# Patient Record
Sex: MALE | Race: WHITE | Employment: FULL TIME | URBAN - METROPOLITAN AREA
[De-identification: names, ages, dates, MRNs, and addresses within clinical notes are randomized per-mention and may not be internally consistent; named-entity substitution may affect disease eponyms.]

---

## 2017-09-13 ENCOUNTER — OFFICE VISIT (OUTPATIENT)
Dept: ORTHOPEDIC SURGERY | Age: 32
End: 2017-09-13

## 2017-09-13 VITALS
DIASTOLIC BLOOD PRESSURE: 90 MMHG | WEIGHT: 197 LBS | OXYGEN SATURATION: 99 % | HEART RATE: 69 BPM | BODY MASS INDEX: 26.11 KG/M2 | HEIGHT: 73 IN | TEMPERATURE: 97.8 F | SYSTOLIC BLOOD PRESSURE: 132 MMHG | RESPIRATION RATE: 14 BRPM

## 2017-09-13 DIAGNOSIS — M54.9 DISCOGENIC PAIN: Primary | ICD-10-CM

## 2017-09-13 RX ORDER — TRAMADOL HYDROCHLORIDE 50 MG/1
TABLET ORAL
COMMUNITY
End: 2017-09-13 | Stop reason: ALTCHOICE

## 2017-09-13 RX ORDER — CYCLOBENZAPRINE HCL 10 MG
TABLET ORAL
COMMUNITY
End: 2017-09-13 | Stop reason: ALTCHOICE

## 2017-09-13 RX ORDER — ETODOLAC 500 MG/1
TABLET, FILM COATED ORAL
COMMUNITY
End: 2017-09-13 | Stop reason: ALTCHOICE

## 2017-09-13 RX ORDER — HYDROCODONE BITARTRATE AND ACETAMINOPHEN 5; 325 MG/1; MG/1
TABLET ORAL
Refills: 0 | COMMUNITY
Start: 2017-09-06 | End: 2017-09-13 | Stop reason: ALTCHOICE

## 2017-09-13 RX ORDER — PREDNISONE 10 MG/1
TABLET ORAL
Refills: 0 | COMMUNITY
Start: 2017-08-31 | End: 2017-09-13 | Stop reason: ALTCHOICE

## 2017-09-13 RX ORDER — HYDROCODONE BITARTRATE AND ACETAMINOPHEN 5; 325 MG/1; MG/1
TABLET ORAL
Qty: 21 TAB | Refills: 0 | Status: SHIPPED | OUTPATIENT
Start: 2017-09-13 | End: 2017-10-11 | Stop reason: ALTCHOICE

## 2017-09-13 RX ORDER — OXYCODONE AND ACETAMINOPHEN 7.5; 325 MG/1; MG/1
TABLET ORAL
COMMUNITY
End: 2017-09-13 | Stop reason: ALTCHOICE

## 2017-09-13 RX ORDER — ALBUTEROL SULFATE 90 UG/1
AEROSOL, METERED RESPIRATORY (INHALATION)
COMMUNITY

## 2017-09-13 RX ORDER — HYDROCODONE BITARTRATE AND ACETAMINOPHEN 7.5; 3 MG/1; MG/1
TABLET ORAL
COMMUNITY
End: 2017-09-13 | Stop reason: ALTCHOICE

## 2017-09-13 RX ORDER — OXYCODONE AND ACETAMINOPHEN 5; 325 MG/1; MG/1
TABLET ORAL
COMMUNITY
Start: 2016-01-06 | End: 2017-09-13 | Stop reason: ALTCHOICE

## 2017-09-13 RX ORDER — NALBUPHINE HYDROCHLORIDE 20 MG/ML
20 INJECTION, SOLUTION INTRAMUSCULAR; INTRAVENOUS; SUBCUTANEOUS
COMMUNITY
End: 2017-10-11 | Stop reason: ALTCHOICE

## 2017-09-13 NOTE — MR AVS SNAPSHOT
Visit Information Date & Time Provider Department Dept. Phone Encounter #  
 9/13/2017  2:30 PM Billie Garcia MD [address] [facility] [phone] [id number] Follow-up Instructions Return in about 4 weeks (around 10/11/2017), or if symptoms worsen or fail to improve. Upcoming Health Maintenance Date Due Pneumococcal 19-64 Medium Risk (1 of 1 - PPSV23) 8/7/2002 DTaP/Tdap/Td series (1 - Tdap) 8/7/2004 INFLUENZA AGE 9 TO ADULT 8/1/2017 Allergies as of 9/13/2017  Review Complete On: 9/13/2017 By: Ziggy Nance LPN Severity Noted Reaction Type Reactions Penicillins    Unknown (comments) Current Immunizations  Never Reviewed No immunizations on file. Not reviewed this visit You Were Diagnosed With   
  
 Codes Comments Discogenic pain    -  Primary ICD-10-CM: M54.9 ICD-9-CM: 724.5 Vitals BP Pulse Temp Resp Height(growth percentile) Weight(growth percentile) 132/90 69 97.8 °F (36.6 °C) (Oral) 14 6' 1\" (1.854 m) 197 lb (89.4 kg) SpO2 BMI Smoking Status 99% 25.99 kg/m2 Current Every Day Smoker BMI and BSA Data Body Mass Index Body Surface Area  
 25.99 kg/m 2 2.15 m 2 Preferred Pharmacy Pharmacy Name Phone [phone] [facility] [place], [address] AT [address] [phone] Your Updated Medication List  
  
   
This list is accurate as of: 9/13/17  4:49 PM.  Always use your most recent med list.  
  
  
  
  
 HYDROcodone-acetaminophen 5-325 mg per tablet Commonly known as:  Jimbo Collar Take 1 po tid prn pain  
  
 nalbuphine 20 mg/mL injection Commonly known as:  NUBAIN  
20 mg. VENTOLIN HFA 90 mcg/actuation inhaler Generic drug:  albuterol Inhale 2 puffs by mouth every 4-6 hours as needed Prescriptions Printed Refills  HYDROcodone-acetaminophen (NORCO) 5-325 mg per tablet 0  
 Sig: Take 1 po tid prn pain  
 Class: Print We Performed the Following REFERRAL TO PHYSICAL THERAPY [XXG69 Custom] Comments:  
 Evaluate and Treat lumbar discogenic pain - Carlos method - Gee with Giovani Araiza Follow-up Instructions Return in about 4 weeks (around 10/11/2017), or if symptoms worsen or fail to improve. Referral Information Referral ID Referred By Referred To  
  
 [id number]. [address], [address] [place], [address] Phone: [phone] Visits Status Start Date End Date 1 New Request 9/13/17 9/13/18 If your referral has a status of pending review or denied, additional information will be sent to support the outcome of this decision. Patient Instructions Low Back Pain: Exercises Your Care Instructions Here are some examples of typical rehabilitation exercises for your condition. Start each exercise slowly. Ease off the exercise if you start to have pain. Your doctor or physical therapist will tell you when you can start these exercises and which ones will work best for you. How to do the exercises Press-up 1. Lie on your stomach, supporting your body with your forearms. 2. Press your elbows down into the floor to raise your upper back. As you do this, relax your stomach muscles and allow your back to arch without using your back muscles. As your press up, do not let your hips or pelvis come off the floor. 3. Hold for 15 to 30 seconds, then relax. 4. Repeat 2 to 4 times. Alternate arm and leg (bird dog) exercise Note: Do this exercise slowly. Try to keep your body straight at all times, and do not let one hip drop lower than the other. 1. Start on the floor, on your hands and knees. 2. Tighten your belly muscles. 3. Raise one leg off the floor, and hold it straight out behind you. Be careful not to let your hip drop down, because that will twist your trunk. 4. Hold for about 6 seconds, then lower your leg and switch to the other leg. 5. Repeat 8 to 12 times on each leg. 6. Over time, work up to holding for 10 to 30 seconds each time. 7. If you feel stable and secure with your leg raised, try raising the opposite arm straight out in front of you at the same time. Knee-to-chest exercise 1. Lie on your back with your knees bent and your feet flat on the floor. 2. Bring one knee to your chest, keeping the other foot flat on the floor (or keeping the other leg straight, whichever feels better on your lower back). 3. Keep your lower back pressed to the floor. Hold for at least 15 to 30 seconds. 4. Relax, and lower the knee to the starting position. 5. Repeat with the other leg. Repeat 2 to 4 times with each leg. 6. To get more stretch, put your other leg flat on the floor while pulling your knee to your chest. 
Curl-ups 1. Lie on the floor on your back with your knees bent at a 90-degree angle. Your feet should be flat on the floor, about 12 inches from your buttocks. 2. Cross your arms over your chest. If this bothers your neck, try putting your hands behind your neck (not your head), with your elbows spread apart. 3. Slowly tighten your belly muscles and raise your shoulder blades off the floor. 4. Keep your head in line with your body, and do not press your chin to your chest. 
5. Hold this position for 1 or 2 seconds, then slowly lower yourself back down to the floor. 6. Repeat 8 to 12 times. Pelvic tilt exercise 1. Lie on your back with your knees bent. 2. \"Brace\" your stomach. This means to tighten your muscles by pulling in and imagining your belly button moving toward your spine. You should feel like your back is pressing to the floor and your hips and pelvis are rocking back. 3. Hold for about 6 seconds while you breathe smoothly. 4. Repeat 8 to 12 times. Heel dig bridging 1. Lie on your back with both knees bent and your ankles bent so that only your heels are digging into the floor. Your knees should be bent about 90 degrees. 2. Then push your heels into the floor, squeeze your buttocks, and lift your hips off the floor until your shoulders, hips, and knees are all in a straight line. 3. Hold for about 6 seconds as you continue to breathe normally, and then slowly lower your hips back down to the floor and rest for up to 10 seconds. 4. Do 8 to 12 repetitions. Hamstring stretch in doorway 1. Lie on your back in a doorway, with one leg through the open door. 2. Slide your leg up the wall to straighten your knee. You should feel a gentle stretch down the back of your leg. 3. Hold the stretch for at least 15 to 30 seconds. Do not arch your back, point your toes, or bend either knee. Keep one heel touching the floor and the other heel touching the wall. 4. Repeat with your other leg. 5. Do 2 to 4 times for each leg. Hip flexor stretch 1. Kneel on the floor with one knee bent and one leg behind you. Place your forward knee over your foot. Keep your other knee touching the floor. 2. Slowly push your hips forward until you feel a stretch in the upper thigh of your rear leg. 3. Hold the stretch for at least 15 to 30 seconds. Repeat with your other leg. 4. Do 2 to 4 times on each side. Wall sit 1. Stand with your back 10 to 12 inches away from a wall. 2. Lean into the wall until your back is flat against it. 3. Slowly slide down until your knees are slightly bent, pressing your lower back into the wall. 4. Hold for about 6 seconds, then slide back up the wall. 5. Repeat 8 to 12 times. Follow-up care is a key part of your treatment and safety. Be sure to make and go to all appointments, and call your doctor if you are having problems. It's also a good idea to know your test results and keep a list of the medicines you take. Where can you learn more? Go to http://jostin-juan.info/. Enter K672 in the search box to learn more about \"Low Back Pain: Exercises. \" Current as of: March 21, 2017 Content Version: 11.3 © 0277-9978 [employer]. Care instructions adapted under license by [employer] (which disclaims liability or warranty for this information). If you have questions about a medical condition or this instruction, always ask your healthcare professional. [address] any warranty or liability for your use of this information. Introducing [facility]! Willadean Saint introduces [employer] patient portal. Now you can access parts of your medical record, email your doctor's office, and request medication refills online. 1. In your internet browser, go to [email or web address] 2. Click on the First Time User? Click Here link in the Sign In box. You will see the New Member Sign Up page. 3. Enter your [employer] Access Code exactly as it appears below. You will not need to use this code after youve completed the sign-up process. If you do not sign up before the expiration date, you must request a new code. · [employer] Access Code: [email or web address] Expires: 12/12/2017  4:49 PM 
 
4. Enter the last four digits of your Social Security Number (xxxx) and Date of Birth (mm/dd/yyyy) as indicated and click Submit. You will be taken to the next sign-up page. 5. Create a [employer] ID. This will be your [employer] login ID and cannot be changed, so think of one that is secure and easy to remember. 6. Create a [employer] password. You can change your password at any time. 7. Enter your Password Reset Question and Answer. This can be used at a later time if you forget your password. 8. Enter your e-mail address. You will receive e-mail notification when new information is available in [address] 9. Click Sign Up. You can now view and download portions of your medical record. 10. Click the Download Summary menu link to download a portable copy of your medical information. If you have questions, please visit the Frequently Asked Questions section of the [employer] website. Remember, [employer] is NOT to be used for urgent needs. For medical emergencies, dial 911. Now available from your iPhone and Android! Please provide this summary of care documentation to your next provider. Your primary care clinician is listed as Kalli Stoddard. If you have any questions after today's visit, please call [phone].

## 2017-09-13 NOTE — PATIENT INSTRUCTIONS

## 2017-09-13 NOTE — PROGRESS NOTES
[address], [address]  [place] [address] [address]  Phone: [phone]  Fax: [phone]        Lew Cunha  : 1985  PCP: Renata Echavarria MD  2017    NEW PATIENT      ASSESSMENT AND PLAN     Ronal Flores comes in to the office today c/o low back pain. His symptoms are likely discogenic in nature with a potential component of lumbar radiculopathy. On the examination he had flexion based pain. He also had a mildly positive bilateral straight leg raise. I referred him for Abhijit Cowart PT. I prescribed him Norco 5-325 mg TID prn. Pt will f/u in 4 weeks or sooner if needed. Diagnoses and all orders for this visit:    1. Discogenic pain         Follow-up Disposition:  Return in about 4 weeks (around 10/11/2017), or if symptoms worsen or fail to improve. CHIEF COMPLAINT  Ronal Flores is seen today in consultation at the request of Renata Echavarria MD for complaints of chronic low back pain. HISTORY OF PRESENT ILLNESS  Ronal Flores is a 27 y.o. male c/o chronic low back pain that occurred after a work injury 2 years ago. He describes his symptoms as gradually worsening, constant, throbbing pain. He reports prolonged sitting will exacerbate his symptoms. He notes that standing will help alleviate his pain. He notes back flexion will increase his pain. He reports his pain is also worse with lying in the supine position. He notes back extension helps decrease his pain. He has been treated with Prednisone which did not improve his symptoms. Pt denies any fevers, chills, nausea, vomiting. Pt denies any chest pain and shortness of breath. Pt denies any ear, nose, and throat problems. Pt denies any fecal or urinary incontinence. He works as a . Excerpt from Dr. Lieutenant Riggins note on 02/15/2017:  HISTORY OF PRESENT ILLNESS: Ronal Flores is a 26 y.o. male who presents to the office for lower back pain. Pt notes injury a few months ago in which he was caught between the bucket of his truck and a tree branch but did not have initial back pain. Pt notes pain onset about 4 weeks after injury. Pt has treated with Vicodin and Tramadol with some relief. Pt also treated with Ibuprofen with some relief. PAST MEDICAL HISTORY   Past Medical History:   Diagnosis Date    Asthma     Right shoulder pain     Subacromial bursitis     Right shoulder       No past surgical history on file. MEDICATIONS      Current Outpatient Prescriptions   Medication Sig Dispense Refill    albuterol (VENTOLIN HFA) 90 mcg/actuation inhaler Inhale 2 puffs by mouth every 4-6 hours as needed      HYDROcodone-acetaminophen (NORCO) 5-325 mg per tablet TK 1 T PO Q 6 H  0    HYDROcodone-acetaminophen (VICODIN ES) 7.5-300 mg tablet Take 1 tablet(s) every 6 hours by oral route as needed.  nalbuphine (NUBAIN) 20 mg/mL injection 20 mg. ALLERGIES    Allergies   Allergen Reactions    Penicillins Unknown (comments)          SOCIAL HISTORY    Social History     Social History    Marital status:      Spouse name: N/A    Number of children: N/A    Years of education: N/A     Social History Main Topics    Smoking status: Current Every Day Smoker    Smokeless tobacco: Never Used    Alcohol use Not on file    Drug use: No    Sexual activity: Not on file     Other Topics Concern    Not on file     Social History Narrative     Social History Narrative      Problem Relation Age of Onset    Asthma Mother     Asthma Father     Hypertension Father          REVIEW OF SYSTEMS  Review of Systems   Constitutional: Negative for chills, diaphoresis, fever, malaise/fatigue and weight loss. Respiratory: Negative for shortness of breath. Cardiovascular: Negative for chest pain and leg swelling. Gastrointestinal: Negative for constipation, nausea and vomiting.    Neurological: Negative for dizziness, tingling, seizures, loss of consciousness, weakness and headaches. Psychiatric/Behavioral: The patient does not have insomnia. PHYSICAL EXAMINATION  Visit Vitals    /90    Pulse 69    Temp 97.8 °F (36.6 °C) (Oral)    Resp 14    Ht 6' 1\" (1.854 m)    Wt 197 lb (89.4 kg)    SpO2 99%    BMI 25.99 kg/m2         Pain Assessment  9/13/2017   Location of Pain Back   Severity of Pain 8   Quality of Pain Aching   Duration of Pain Persistent   Frequency of Pain Constant   Aggravating Factors -   Limiting Behavior -   Relieving Factors -   Result of Injury No         Constitutional:  Well developed, well nourished, in no acute distress. Psychiatric: Affect and mood are appropriate. HEENT: Normocephalic, atraumatic. Extraocular movements intact. Integumentary: No rashes or abrasions noted on exposed areas. Cardiovascular: Regular rate and rhythm. Pulmonary: Clear to auscultation bilaterally. SPINE/MUSCULOSKELETAL EXAM    Cervical spine:  Neck is midline. Normal muscle tone. No focal atrophy is noted. ROM pain free. Shoulder ROM intact. No tenderness to palpation. Negative Spurling's sign. Negative Tinel's sign. Negative Contreras's sign. Sensation in the bilateral arms grossly intact to light touch. Lumbar spine:  No rash, ecchymosis, or gross obliquity. No fasciculations. No focal atrophy is noted. No pain with hip ROM. Full range of motion. Tenderness to palpation. No tenderness to palpation at the sciatic notch. SI joints non-tender. Trochanters non tender. Pain with back flexion      Sensation in the bilateral legs grossly intact to light touch.       MOTOR:      Biceps  Triceps Deltoids Wrist Ext Wrist Flex Hand Intrin   Right 5/5 5/5 5/5 5/5 5/5 5/5   Left 5/5 5/5 5/5 5/5 5/5 5/5             Hip Flex  Quads Hamstrings Ankle DF EHL Ankle PF   Right 5/5 5/5 5/5 5/5 5/5 5/5   Left 5/5 5/5 5/5 5/5 5/5 5/5     DTRs are 2+ biceps, triceps, brachioradialis, patella, and Achilles. Positive bilateral Straight Leg raise. Squat not tested. No difficulty with tandem gait. Ambulation without assistive device. FWB. RADIOGRAPHS  Lumbar XR images taken on 02/15/2016 personally reviewed with patient:  X-rays unremarkable for any disc space narrowing, spondylolisthesis, or other congenital abnormalities.  reviewed    Mr. Julieta Smalls has a reminder for a \"due or due soon\" health maintenance. I have asked that he contact his primary care provider for follow-up on this health maintenance. This plan was reviewed with the patient and patient agrees. All questions were answered. More than half of this visit today was spent on counseling. Written by Andrea Monte, as dictated by Dr. Oleksandr Goodwin. I, Dr. Oleksandr Goodwin, confirm that all documentation is accurate.

## 2017-09-27 ENCOUNTER — HOSPITAL ENCOUNTER (OUTPATIENT)
Dept: PHYSICAL THERAPY | Age: 32
Discharge: HOME | End: 2017-09-27
Payer: COMMERCIAL

## 2017-09-27 PROCEDURE — 97112 NEUROMUSCULAR REEDUCATION: CPT

## 2017-09-27 PROCEDURE — 97162 PT EVAL MOD COMPLEX 30 MIN: CPT

## 2017-09-27 PROCEDURE — 97012 MECHANICAL TRACTION THERAPY: CPT

## 2017-09-27 NOTE — PROGRESS NOTES
PT DAILY TREATMENT NOTE     Patient Name: Ritu Quiñonez  Date:2017  : 1985  [x]  Patient  Verified  Payor: Mago Olivera / Plan: [place] [employer]  CAPITATED PT / Product Type: Commerical /    In time:1139  Out time:1230  Total Treatment Time (min): 51  Visit #: 1 of 8    Treatment Area: Low back pain [M54.5]    SUBJECTIVE  Pain Level (0-10 scale): 8  Any medication changes, allergies to medications, adverse drug reactions, diagnosis change, or new procedure performed?: [x] No    [] Yes (see summary sheet for update)  Subjective functional status/changes:   [] No changes reported  See eval    OBJECTIVE    Modality rationale: decrease pain to improve the patients ability to increase ease with daily activities   Min Type Additional Details    [] Estim:  []Unatt       []IFC  []Premod                        []Other:  []w/ice   []w/heat  Position:  Location:    [] Estim: []Att    []TENS instruct  []NMES                    []Other:  []w/US   []w/ice   []w/heat  Position:  Location:   10 [x]  Traction: [] Cervical       [x]Lumbar                       [] Prone          [x]Supine                       []Intermittent   [x]Continuous Lbs:90  [] before manual  [] after manual    []  Ultrasound: []Continuous   [] Pulsed                           []1MHz   []3MHz W/cm2:  Location:    []  Iontophoresis with dexamethasone         Location: [] Take home patch   [] In clinic    []  Ice     []  heat  []  Ice massage  []  Laser   []  Anodyne Position:  Location:    []  Laser with stim  []  Other:  Position:  Location:    []  Vasopneumatic Device Pressure:       [] lo [] med [] hi   Temperature: [] lo [] med [] hi   [x] Skin assessment post-treatment:  []intact []redness- no adverse reaction    []redness  adverse reaction:     15 min []Eval                  []Re-Eval       26 min Neuromuscular Re-education:  []  See flow sheet :   Rationale: increase ROM and increase strength  to improve the patients TA and glute recruitment          With   [] TE   [] TA   [] neuro   [] other: Patient Education: [x] Review HEP    [] Progressed/Changed HEP based on:   [] positioning   [] body mechanics   [] transfers   [] heat/ice application    [] other:      Other Objective/Functional Measures:      Pain Level (0-10 scale) post treatment: 6-7    ASSESSMENT/Changes in Function: see POC    Patient will continue to benefit from skilled PT services to modify and progress therapeutic interventions, address functional mobility deficits, address ROM deficits, address strength deficits, analyze and address soft tissue restrictions, analyze and cue movement patterns and assess and modify postural abnormalities to attain remaining goals. [x]  See Plan of Care  []  See progress note/recertification  []  See Discharge Summary         Progress towards goals / Updated goals:  Short Term Goals: To be accomplished in 2 weeks:                         1. I and compliant with HEP for self management of symptoms. Long Term Goals: To be accomplished in 4 weeks:                         1. Improve FOTO to 69 to indicate improved function with daily activities. 2. Perform supine oov exercises without rectus bulge to indicate improved TA strength/stability for work tasks. 3. Increase B glute max strength to grossly 4+/5 to improve stability for daily activities.      PLAN  []  Upgrade activities as tolerated     [x]  Continue plan of care  []  Update interventions per flow sheet       []  Discharge due to:_  []  Other:_      Yeimi Ron, PT 9/27/2017  2:02 PM    Future Appointments  Date Time Provider Brooke Thompson   10/4/2017 6:00 PM Yeimi Ron, PT [facility]   10/6/2017 7:30 AM Linda Florentino, PTA [facility]   10/11/2017 9:15 AM [address]    [facility]12[facility]2017 [id number]   10/17/2017 7:30 AM Ludin Salazar, PTA [facility]   10/20/2017 6:00 PM [id number] [place] [address]   10/24/2017 7:30 BLADIMIR Salazar, PTA [facility]   10/27/2017 4:30 PM [facility]

## 2017-09-27 NOTE — PROGRESS NOTES
[facility]  [address] [facility] [address] Hartman [address]  [place], [address]  [phone] fax    Plan of Care/ Statement of Necessity for Physical Therapy Services    Patient name: Levi Gomez Start of Care: 2017   Referral source: Gregory Beltran MD : 1985    Medical Diagnosis: Low back pain [M54.5]   Onset Date:1 month ago    Treatment Diagnosis: LBP   Prior Hospitalization: see medical history Provider#: [id number]   Medications: Verified on Patient summary List    Comorbidities: tobacco use; asthma   Prior Level of Function: ; able to tolerate prolonged positions     The Plan of Care and following information is based on the information from the initial evaluation. Assessment/ key information: 27y.o. year old male presents with CC of LBP that is consistent with mechanical back pain. Deficits include: poor segmental L/S mobility and minimal to no reverse lordosis; decrease L/S ROM; poor TA and glute recruitment. Patient will benefit from physical therapy to address deficits, and ultimately to return patient to prior level of function. Evaluation Complexity History LOW Complexity : Zero comorbidities / personal factors that will impact the outcome / POC; Examination MEDIUM Complexity : 3 Standardized tests and measures addressing body structure, function, activity limitation and / or participation in recreation  ;Presentation MEDIUM Complexity : Evolving with changing characteristics  ; Clinical Decision Making MEDIUM Complexity : FOTO score of 26-74  Overall Complexity Rating: MEDIUM  Problem List: pain affecting function, decrease ROM, decrease strength, decrease ADL/ functional abilitiies, decrease activity tolerance and decrease flexibility/ joint mobility   Treatment Plan may include any combination of the following: Therapeutic exercise, Neuromuscular re-education, Physical agent/modality, Manual therapy and Patient education  Patient / Family readiness to learn indicated by: asking questions, trying to perform skills and interest  Persons(s) to be included in education: patient (P)  Barriers to Learning/Limitations: None  Patient Goal (s): to decrease pain  Patient Self Reported Health Status: good  Rehabilitation Potential: good    Short Term Goals: To be accomplished in 2 weeks:   1. I and compliant with HEP for self management of symptoms. Long Term Goals: To be accomplished in 4 weeks:   1. Improve FOTO to 69 to indicate improved function with daily activities. 2. Perform supine oov exercises without rectus bulge to indicate improved TA strength/stability for work tasks. 3. Increase B glute max strength to grossly 4+/5 to improve stability for daily activities. Frequency / Duration: Patient to be seen 2 times per week for 4 weeks. Patient/ Caregiver education and instruction: Diagnosis, prognosis, exercises   [x]  Plan of care has been reviewed with BRENDAN Alfred, PT 9/27/2017 1:37 PM    ________________________________________________________________________    I certify that the above Therapy Services are being furnished while the patient is under my care. I agree with the treatment plan and certify that this therapy is necessary.     [de-identified] Signature:____________________  Date:____________Time: _________    Please sign and return to [facility] [address] Alok Hartman [address] [place] [address], [facility] [address]  [phone] fax

## 2017-10-04 ENCOUNTER — HOSPITAL ENCOUNTER (OUTPATIENT)
Dept: PHYSICAL THERAPY | Age: 32
Discharge: HOME | End: 2017-10-04
Payer: COMMERCIAL

## 2017-10-04 PROCEDURE — 97112 NEUROMUSCULAR REEDUCATION: CPT

## 2017-10-04 PROCEDURE — 97140 MANUAL THERAPY 1/> REGIONS: CPT

## 2017-10-04 PROCEDURE — 97012 MECHANICAL TRACTION THERAPY: CPT

## 2017-10-04 PROCEDURE — 97110 THERAPEUTIC EXERCISES: CPT

## 2017-10-04 NOTE — PROGRESS NOTES
PT DAILY TREATMENT NOTE     Patient Name: Dell House  Date:10/4/2017  : 1985  [x]  Patient  Verified  Payor: Juan R Valencia / Plan: [place] [employer]  CAPITATED PT / Product Type: Commerical /    In time:558  Out time:644  Total Treatment Time (min): 46  Visit #: 2 of 8    Treatment Area: Low back pain [M54.5]    SUBJECTIVE  Pain Level (0-10 scale): 6-7  Any medication changes, allergies to medications, adverse drug reactions, diagnosis change, or new procedure performed?: [x] No    [] Yes (see summary sheet for update)  Subjective functional status/changes:   [] No changes reported  The pain never goes away. Reports he occasionally does the exercises. \" I get enough exercise at work\".     OBJECTIVE    Modality rationale: decrease pain to improve the patients ability to increase ease with daily activities   Min Type Additional Details    [] Estim:  []Unatt       []IFC  []Premod                        []Other:  []w/ice   []w/heat  Position:  Location:    [] Estim: []Att    []TENS instruct  []NMES                    []Other:  []w/US   []w/ice   []w/heat  Position:  Location:   10 [x]  Traction: [] Cervical       [x]Lumbar                       [] Prone          [x]Supine                       []Intermittent   [x]Continuous Lbs:90  [] before manual  [x] after manual    []  Ultrasound: []Continuous   [] Pulsed                           []1MHz   []3MHz W/cm2:  Location:    []  Iontophoresis with dexamethasone         Location: [] Take home patch   [] In clinic    []  Ice     []  heat  []  Ice massage  []  Laser   []  Anodyne Position:  Location:    []  Laser with stim  []  Other:  Position:  Location:    []  Vasopneumatic Device Pressure:       [] lo [] med [] hi   Temperature: [] lo [] med [] hi   [x] Skin assessment post-treatment:  []intact []redness- no adverse reaction        8 min Therapeutic Exercise:  [] See flow sheet :   Rationale: increase ROM to improve the patients ability to perform daily acitvities     20 min Neuromuscular Re-education:  []  See flow sheet :   Rationale: increase ROM and increase strength  to improve the patients ability to recruit TA/shut down paraspinals druing work activiites    8 min Manual Therapy:   Per flow sheet   Rationale: decrease pain and increase ROM to improve hip mobility for work tasks  Dry Needling Procedure Note    Procedure: An intramuscular manual therapy (dry needling) and a neuro-muscular re-education treatment was done to deactivate myofascial trigger points with a 30 gauge filament needle under aseptic technique. Indications:  [x] Myofascial pain and dysfunction [] Muscled spasms  [] Myalgia/myositis   [] Muscle cramps  [x] Muscle imbalances  [] Other:    Chart reviewed for the following:  Ashley GARCIA PT, have reviewed the medical history, summary list and precautions/contraindications for [place] [facility].   TIME OUT performed immediately prior to start of procedure:  Ashley GARCIA PT, have performed the following reviews on MICMALI prior to the start of the session:      [x] Verified patient identification by name and date of birth    [x] Agreement on all muscles being treated was verified   [x] Purpose of dry needling, side effects, possible complications, risks and benefits were explained to the patient   [x] Procedure site(s) verified  [x] Patient was positioned for comfort and draped for privacy  [x] Informed Consent was signed (initial visit) and verified verbally (subsequent visits)  [x] Patient was instructed on the need to report the use of blood thinners and/or immunosuppressant medications  [x] How to respond to possible adverse effects of treatment  [x] Self treatment of post needling soreness: ice, heat (moist heat, heat wraps) and stretching  [x] Opportunity was given to ask any questions, all questions were answered            Time: 603  Date of procedure: 10/4/2017    Treatment: The following muscles were treated today with intramuscular dry needling  [] Left [] Right Abdominals: Ant Rectus Abdominis  [] Left [] Right External Oblique / Internal Oblique / Transverse Abdominis  [] Left [] Right Thoracic Multifidi / Rotatores  [x] Left [x] Right Iliocostalis Thoracis / Lumborum  [x] Left [x] Right Longissimus Thoracis / Lumborum  [x] Left [x] Right Lumbar Multifidi  [] Left [] Right Serratus Posterior Inferior  [] Left [] Right Quadratus Lumborum  [] Left [] Right Psoas  [] Left [] Right Iliacus  [] Left [] Right Iliopsoas (inguinal)  [] Left [] Right Piriformis  [] Left [] Right Quadratus Femoris  [] Left [] Right Miguelangel Mary / Mary Ann Poole / Brooke Barlow  [] Left [] Right Obturator Internus  [] Left [] Right Obturator Externus / Kenny Rakesh / Inferior Gemellus    [] Left [] Right Tensor Fasciae Loly  [] Left [] Right Iliotibial Band  [] Left [] Right Pectineus  [] Left [] Right Sartorius  [] Left [] Right Gracilis  [] Left [] Right Adductor Brevis / Adductor Longus / Adductor Isac  [] Left [] Right Rectus Femoris / Vastus Lateralis / Vastus Intermedius / Vastus Medialis Obliquus  [] Left [] Right Tibialis Anterior / Posterior  [] Left [] Right Extensor Digitorum Longus / Brevis  [] Left [] Right Extensor Hallucis Longus / Brevis  [] Left [] Right Hamstrings: Biceps Femoris / Lilibeth Neat / Semimembranosis  [] Left [] Right Popliteus / Planteris  [] Left [] Right Gastrocnemius Medial / Lateral  [] Left [] Right Soleus  [] Left [] Right Peronei: Longus / Mickiel Young / Tertius  [] Left [] Right Flexor Digitorum Longus / Brevis  [] Left [] Right Flexor Hallucis Longus / Brevis  [] Left [] Right Quadratus Plantae  [] Left [] Right Abductor Digiti Minimi  [] Left [] Right Foot Interossei  [] Left [] Right Other:    Patient's response to today's treatment:  [x] Latent Twitch Response  [x] Muscle relaxation [x] Pain Relief  [x] Post needling soreness [x] without complications  [x] Increased Range of Motion  [] Other:     Performed by: Maryana Alvarez Cristina Correa, PT            With   [] TE   [] TA   [] neuro   [] other: Patient Education: [x] Review HEP    [] Progressed/Changed HEP based on:   [] positioning   [] body mechanics   [] transfers   [] heat/ice application    [] other:      Other Objective/Functional Measures:      Pain Level (0-10 scale) post treatment: 6    ASSESSMENT/Changes in Function: Good tolerance to DN. Explained and educated the importance of compliance with HEP. Patient argued that he \"gets enough exercise at work\". Explained that patient is performing work tasks in a compromised position which will lead to further injury. Patient will continue to benefit from skilled PT services to modify and progress therapeutic interventions, address functional mobility deficits, address ROM deficits, address strength deficits, analyze and address soft tissue restrictions, analyze and cue movement patterns and assess and modify postural abnormalities to attain remaining goals. []  See Plan of Care  []  See progress note/recertification  []  See Discharge Summary         Progress towards goals / Updated goals:  Short Term Goals: To be accomplished in 2 weeks:                         1. I and compliant with HEP for self management of symptoms. Reports semi-compliance 10/4/17  Long Term Goals: To be accomplished in 4 weeks:                         1. Improve FOTO to 69 to indicate improved function with daily activities. 2. Perform supine oov exercises without rectus bulge to indicate improved TA strength/stability for work tasks. 3. Increase B glute max strength to grossly 4+/5 to improve stability for daily activities.      PLAN  []  Upgrade activities as tolerated     []  Continue plan of care  []  Update interventions per flow sheet       []  Discharge due to:_  []  Other:_      Yeimi Ron, PT 10/4/2017  6:03 PM    Future Appointments  Date Time Provider Brooke Thompson   10/6/2017 7:30 AM [facility] 10/11/2017 9:15 AM Whitney Tran  [address]   10/12/2017 4:30 PM [id number] [facility]   10/16/2017 6:00 PM Lisa Alvarado, PT [facility]   10/20/2017 6:00 PM [id number] [facility]   10/23/2017 6:00 PM Hillary Kruger PTA [facility]   10/27/2017 4:30 PM Kalin Morris [facility]

## 2017-10-06 ENCOUNTER — APPOINTMENT (OUTPATIENT)
Dept: PHYSICAL THERAPY | Age: 32
End: 2017-10-06
Payer: COMMERCIAL

## 2017-10-11 ENCOUNTER — OFFICE VISIT (OUTPATIENT)
Dept: ORTHOPEDIC SURGERY | Age: 32
End: 2017-10-11

## 2017-10-11 VITALS
OXYGEN SATURATION: 100 % | TEMPERATURE: 98 F | SYSTOLIC BLOOD PRESSURE: 128 MMHG | DIASTOLIC BLOOD PRESSURE: 82 MMHG | WEIGHT: 201.8 LBS | HEIGHT: 73 IN | BODY MASS INDEX: 26.74 KG/M2 | HEART RATE: 60 BPM | RESPIRATION RATE: 18 BRPM

## 2017-10-11 DIAGNOSIS — M54.16 LUMBAR RADICULOPATHY: Primary | ICD-10-CM

## 2017-10-11 RX ORDER — HYDROCODONE BITARTRATE AND ACETAMINOPHEN 5; 325 MG/1; MG/1
1 TABLET ORAL
Qty: 28 TAB | Refills: 0 | Status: SHIPPED | OUTPATIENT
Start: 2017-10-11 | End: 2019-04-10 | Stop reason: ALTCHOICE

## 2017-10-11 RX ORDER — PREDNISONE 10 MG/1
10 TABLET ORAL SEE ADMIN INSTRUCTIONS
Qty: 21 TAB | Refills: 0 | Status: SHIPPED | OUTPATIENT
Start: 2017-10-11 | End: 2019-04-10 | Stop reason: ALTCHOICE

## 2017-10-11 NOTE — PROGRESS NOTES
[address], [address]  [place], [address] [place] [address] Street  Phone: [phone]  Fax: [phone]        Sabrina Nunez  : 1985  PCP: Andrei Bennett MD  10/11/2017    PROGRESS NOTE      ASSESSMENT AND PLAN    Andrew Miles comes in to the office today for a PT f/u. The sessions have not provided long term relief. He will continue with his current plan of care at PT. He has had conservative treatment starting on 2017 that included; Norco, a Toradol injection, a Prednisone pack, and PT. He has not shown improvement with this. I referred him to get a lumbar MRI to further evaluate potential pain generators. I also prescribed him Norco 5-325 mg QID prn and a Prednisone 10 mg dose pack. Pt will f/u in 2 weeks or sooner as needed. Diagnoses and all orders for this visit:    1. Lumbar radiculopathy  -     MRI LUMB SPINE WO CONT; Future  -     HYDROcodone-acetaminophen (NORCO) 5-325 mg per tablet; Take 1 Tab by mouth four (4) times daily as needed for Pain. Max Daily Amount: 4 Tabs. -     predniSONE (STERAPRED DS) 10 mg dose pack; Take 1 Tab by mouth See Admin Instructions. See administration instruction per 10mg dose pack         Follow-up Disposition:  Return in about 2 weeks (around 10/25/2017), or if symptoms worsen or fail to improve. HISTORY OF PRESENT ILLNESS  Andrew Miles is a 27 y.o. male. A&P / HPI from 2017:  Andrew Miles comes in to the office today c/o low back pain.      His symptoms are likely discogenic in nature with a potential component of lumbar radiculopathy. On the examination he had flexion based pain. He also had a mildly positive bilateral straight leg raise.      I referred him for [facility]. I prescribed him Norco 5-325 mg TID prn. HISTORY OF PRESENT ILLNESS  Andrew Miles is a 27 y.o. male c/o chronic low back pain that occurred after a work injury 2 years ago.  He describes his symptoms as gradually worsening, constant, throbbing pain. He reports prolonged sitting will exacerbate his symptoms. He notes that standing will help alleviate his pain. He notes back flexion will increase his pain. He reports his pain is also worse with lying in the supine position. He notes back extension helps decrease his pain. He has been treated with Prednisone which did not improve his symptoms. He was evaluated by his PCP for these symptoms on 08/31/2017. They treated him with a Toradol injection, Norco, and a Prednisone 10 mg dose pack.      Pt denies any fevers, chills, nausea, vomiting. Pt denies any chest pain and shortness of breath. Pt denies any ear, nose, and throat problems. Pt denies any fecal or urinary incontinence.      He works as a .      Excerpt from Dr. Dominga Clement note on 02/15/2016:  HISTORY OF PRESENT ILLNESS: Abiodun Ro a 26 y. o. male who presents to the office for lower back pain.  Pt notes injury a few months ago in which he was caught between the bucket of his truck and a tree branch but did not have initial back pain.  Pt notes pain onset about 4 weeks after injury.  Pt has treated with Vicodin and Tramadol with some relief.  Pt also treated with Ibuprofen with some relief. Updates from 10/11/17:  Pt presents for a PT f/u. The sessions have not provided significant relief. He has found relief with the traction machine. PAST MEDICAL HISTORY   Past Medical History:   Diagnosis Date    Asthma     Right shoulder pain     Subacromial bursitis     Right shoulder       No past surgical history on file. Gareth Spies       MEDICATIONS      Current Outpatient Prescriptions   Medication Sig Dispense Refill    albuterol (VENTOLIN HFA) 90 mcg/actuation inhaler Inhale 2 puffs by mouth every 4-6 hours as needed      nalbuphine (NUBAIN) 20 mg/mL injection 20 mg.      HYDROcodone-acetaminophen (NORCO) 5-325 mg per tablet Take 1 po tid prn pain 21 Tab 0        ALLERGIES    Allergies Allergen Reactions    Penicillins Unknown (comments)          SOCIAL HISTORY    Social History     Social History    Marital status:      Spouse name: N/A    Number of children: N/A    Years of education: N/A     Social History Main Topics    Smoking status: Current Every Day Smoker    Smokeless tobacco: Never Used    Alcohol use Not on file    Drug use: No    Sexual activity: Not on file     Other Topics Concern    Not on file     Social History Narrative     Social History Narrative      Problem Relation Age of Onset    Asthma Mother     Asthma Father     Hypertension Father          REVIEW OF SYSTEMS  Review of Systems   Constitutional: Negative for chills, diaphoresis, fever, malaise/fatigue and weight loss. Respiratory: Negative for shortness of breath. Cardiovascular: Negative for chest pain and leg swelling. Gastrointestinal: Negative for constipation, nausea and vomiting. Neurological: Negative for dizziness, tingling, seizures, loss of consciousness, weakness and headaches. Psychiatric/Behavioral: The patient does not have insomnia. PHYSICAL EXAMINATION  Visit Vitals    /82    Pulse 60    Temp 98 °F (36.7 °C) (Oral)    Resp 18    Ht 6' 1\" (1.854 m)    Wt 201 lb 12.8 oz (91.5 kg)    SpO2 100%    BMI 26.62 kg/m2       Pain Assessment  9/13/2017   Location of Pain Back   Severity of Pain 8   Quality of Pain Aching   Duration of Pain Persistent   Frequency of Pain Constant   Aggravating Factors -   Limiting Behavior -   Relieving Factors -   Result of Injury No           Constitutional:  Well developed, well nourished, in no acute distress. Psychiatric: Affect and mood are appropriate. Integumentary: No rashes or abrasions noted on exposed areas. SPINE/MUSCULOSKELETAL EXAM    Cervical spine:  Neck is midline. Normal muscle tone. No focal atrophy is noted. ROM pain free. Shoulder ROM intact. No tenderness to palpation.   Negative Spurling's sign. Negative Tinel's sign. Negative Contreras's sign.       Sensation in the bilateral arms grossly intact to light touch.      Lumbar spine:  No rash, ecchymosis, or gross obliquity. No fasciculations. No focal atrophy is noted. No pain with hip ROM. Full range of motion. Tenderness to palpation. No tenderness to palpation at the sciatic notch. SI joints non-tender. Trochanters non tender. Pain with back flexion       Sensation in the bilateral legs grossly intact to light touch. MOTOR:      Biceps  Triceps Deltoids Wrist Ext Wrist Flex Hand Intrin   Right 5/5 5/5 5/5 5/5 5/5 5/5   Left 5/5 5/5 5/5 5/5 5/5 5/5             Hip Flex  Quads Hamstrings Ankle DF EHL Ankle PF   Right 5/5 5/5 5/5 5/5 5/5 5/5   Left 5/5 5/5 5/5 5/5 5/5 5/5     DTRs are 2+ biceps, triceps, brachioradialis, patella, and Achilles.     Positive bilateral Straight Leg raise. Squat not tested. No difficulty with tandem gait.      Ambulation without assistive device. FWB.       RADIOGRAPHS  Lumbar XR images taken on 02/15/2016 personally reviewed with patient:  X-rays unremarkable for any disc space narrowing, spondylolisthesis, or other congenital abnormalities.       reviewed     Mr. Magi Adams has a reminder for a \"due or due soon\" health maintenance. I have asked that he contact his primary care provider for follow-up on this health maintenance.      This plan was reviewed with the patient and patient agrees. All questions were answered.  More than half of this visit today was spent on counseling.     Written by Triston Glass, as dictated by Dr. Stefania Lozano, Dr. Kayla Cook, confirm that all documentation is accurate.

## 2017-10-11 NOTE — MR AVS SNAPSHOT
Visit Information Date & Time Provider Department Dept. Phone Encounter #  
 10/11/2017  9:15 AM Armando Wooten MD [facility] [phone] [id number] Follow-up Instructions Return in about 2 weeks (around 10/25/2017), or if symptoms worsen or fail to improve. Upcoming Health Maintenance Date Due Pneumococcal 19-64 Medium Risk (1 of 1 - PPSV23) 8/7/2002 DTaP/Tdap/Td series (1 - Tdap) 8/7/2004 INFLUENZA AGE 9 TO ADULT 8/1/2017 Allergies as of 10/11/2017  Review Complete On: 9/27/2017 By: Remberto Mcelroy, PT Severity Noted Reaction Type Reactions Penicillins    Unknown (comments) Current Immunizations  Never Reviewed No immunizations on file. Not reviewed this visit You Were Diagnosed With   
  
 Codes Comments Lumbar radiculopathy    -  Primary ICD-10-CM: M54.16 
ICD-9-CM: 724.4 Vitals BP Pulse Temp Resp Height(growth percentile) Weight(growth percentile) 128/82 60 98 °F (36.7 °C) (Oral) 18 6' 1\" (1.854 m) 201 lb 12.8 oz (91.5 kg) SpO2 BMI Smoking Status 100% 26.62 kg/m2 Current Every Day Smoker BMI and BSA Data Body Mass Index Body Surface Area  
 26.62 kg/m 2 2.17 m 2 Preferred Pharmacy Pharmacy Name Phone [phone] - [address], [address] AT [address] [phone] Your Updated Medication List  
  
   
This list is accurate as of: 10/11/17 10:51 AM.  Always use your most recent med list.  
  
  
  
  
 HYDROcodone-acetaminophen 5-325 mg per tablet Commonly known as:  Sekou Eaton Take 1 Tab by mouth four (4) times daily as needed for Pain. Max Daily Amount: 4 Tabs. predniSONE 10 mg dose pack Commonly known as:  STERAPRED DS Take 1 Tab by mouth See Admin Instructions. See administration instruction per 10mg dose pack VENTOLIN HFA 90 mcg/actuation inhaler Generic drug:  albuterol Inhale 2 puffs by mouth every 4-6 hours as needed Prescriptions Printed Refills HYDROcodone-acetaminophen (NORCO) 5-325 mg per tablet 0 Sig: Take 1 Tab by mouth four (4) times daily as needed for Pain. Max Daily Amount: 4 Tabs. Class: Print Route: Oral  
  
Prescriptions Sent to Pharmacy Refills  
 predniSONE (STERAPRED DS) 10 mg dose pack 0 Sig: Take 1 Tab by mouth See Admin Instructions. See administration instruction per 10mg dose pack Class: Normal  
 Pharmacy: [facility] [address] [place] [address] AT [address] Ph #: [phone] Route: Oral  
  
Follow-up Instructions Return in about 2 weeks (around 10/25/2017), or if symptoms worsen or fail to improve. To-Do List   
 10/12/2017 4:30 PM  
  Appointment with Maddison Gambino at [facility]  [address] ([phone]) 10/16/2017 6:00 PM  
  Appointment with King Deysi PT at [facility]  [facility] ([phone]) 10/18/2017 Imaging:  MRI LUMB SPINE WO CONT   
  
 10/19/2017 7:00 PM  
  Appointment with [facility] MRI RM 2 at [address] [facility] ([phone]) GENERAL INSTRUCTIONS  Bring information (ID card) if you have any medically implanted devices. You will be required to lie still while the procedure is being performed. Remove any jewelry (including body piercing, hairpins) prior to MRI. If you have had a creatinine level drawn within the past 30 days, please bring most recent results to your appt. Bring any films, CD's, and reports related to your study with you on the day of your exam.  This only includes studies done outside of [address] [place] [address], [facility], [place], and [place]. Bring a complete list of all medications you are currently taking to include prescriptions, over-the-counter meds, herbals, vitamins & any dietary supplements.   If you were given medications for claustrophobia or anxiety, please arrange to have someone drive you to your appointment. QUESTIONS  Notify the MRI Department if you have any questions concerning your study. Sary Edgar [phone]  
  
 10/20/2017 6:00 PM  
  Appointment with Jj Davenport at [facility]  [facility] ([phone])  
  
 10/23/2017 6:00 PM  
  Appointment with Km Robertson PTA at [facility]  [facility] ([phone])  
  
 10/27/2017 4:30 PM  
  Appointment with Cainrandy Tijerinaarminda at [address] [place] [address] ([phone]) Introducing [facility] SERVICES! [facility] introduces [employer] patient portal. Now you can access parts of your medical record, email your doctor's office, and request medication refills online. 1. In your internet browser, go to [email or web address] 2. Click on the First Time User? Click Here link in the Sign In box. You will see the New Member Sign Up page. 3. Enter your [employer] Access Code exactly as it appears below. You will not need to use this code after youve completed the sign-up process. If you do not sign up before the expiration date, you must request a new code. · [employer] Access Code: [email or web address] Expires: 12/12/2017  4:49 PM 
 
4. Enter the last four digits of your Social Security Number (xxxx) and Date of Birth (mm/dd/yyyy) as indicated and click Submit. You will be taken to the next sign-up page. 5. Create a [employer] ID. This will be your [employer] login ID and cannot be changed, so think of one that is secure and easy to remember. 6. Create a [employer] password. You can change your password at any time. 7. Enter your Password Reset Question and Answer. This can be used at a later time if you forget your password. 8. Enter your e-mail address. You will receive e-mail notification when new information is available in [address] 9. Click Sign Up. You can now view and download portions of your medical record. 10. Click the Download Summary menu link to download a portable copy of your medical information. If you have questions, please visit the Frequently Asked Questions section of the [employer] website. Remember, [employer] is NOT to be used for urgent needs. For medical emergencies, dial 911. Now available from your iPhone and Android! Please provide this summary of care documentation to your next provider. Your primary care clinician is listed as [phone]. If you have any questions after today's visit, please call [phone].

## 2017-10-12 ENCOUNTER — HOSPITAL ENCOUNTER (OUTPATIENT)
Dept: PHYSICAL THERAPY | Age: 32
Discharge: HOME | End: 2017-10-12
Payer: COMMERCIAL

## 2017-10-12 PROCEDURE — 97110 THERAPEUTIC EXERCISES: CPT

## 2017-10-12 PROCEDURE — 97112 NEUROMUSCULAR REEDUCATION: CPT

## 2017-10-12 PROCEDURE — 97012 MECHANICAL TRACTION THERAPY: CPT

## 2017-10-12 NOTE — PROGRESS NOTES
PT DAILY TREATMENT NOTE     Patient Name: Katia Graft  Date:10/12/2017  : 1985  [x]  Patient  Verified  Payor: Kylee Elkins / Plan: [place] [facility] / Product Type: Commerical /    In time:4:30  Out time:5:23  Total Treatment Time (min): 48  Visit #: 3 of 8    Treatment Area: Low back pain [M54.5]    SUBJECTIVE  Pain Level (0-10 scale): 7  Any medication changes, allergies to medications, adverse drug reactions, diagnosis change, or new procedure performed?: [x] No    [] Yes (see summary sheet for update)  Subjective functional status/changes:   [] No changes reported  \"She said I was gonna be sore, but I'm always sore\"    OBJECTIVE    Modality rationale: increase tissue extensibility to improve the patients ability to perform daily tasks   Min Type Additional Details    [] Estim:  []Unatt       []IFC  []Premod                        []Other:  []w/ice   []w/heat  Position:  Location:    [] Estim: []Att    []TENS instruct  []NMES                    []Other:  []w/US   []w/ice   []w/heat  Position:  Location:   10 [x]  Traction: [] Cervical       [x]Lumbar                       [] Prone          [x]Supine                       []Intermittent   [x]Continuous Lbs:90  [] before manual  [] after manual    []  Ultrasound: []Continuous   [] Pulsed                           []1MHz   []3MHz W/cm2:  Location:    []  Iontophoresis with dexamethasone         Location: [] Take home patch   [] In clinic    []  Ice     []  heat  []  Ice massage  []  Laser   []  Anodyne Position:  Location:    []  Laser with stim  []  Other:  Position:  Location:    []  Vasopneumatic Device Pressure:       [] lo [] med [] hi   Temperature: [] lo [] med [] hi   [] Skin assessment post-treatment:  []intact []redness- no adverse reaction    []redness  adverse reaction:       12 min Therapeutic Exercise:  [] See flow sheet :   Rationale: increase ROM and increase strength to improve the patients ability to perform daily tasks and ADLs     31 min Neuromuscular Re-education:  []  See flow sheet :   Rationale: improve coordination and increase proprioception  to improve the patients ability to perform daily tasks and work duties with reduced back pain    Dry Needling Procedure Note    Procedure: An intramuscular manual therapy (dry needling) and a neuro-muscular re-education treatment was done to deactivate myofascial trigger points with a 30 gauge filament needle under aseptic technique. Indications:  [] Myofascial pain and dysfunction [] Muscled spasms  [] Myalgia/myositis   [] Muscle cramps  [x] Muscle imbalances  [] Other:    Chart reviewed for the following:  Sally GARCIA, have reviewed the medical history, summary list and precautions/contraindications for [place] [facility].   TIME OUT performed immediately prior to start of procedure:  Sally GARCIA, have performed the following reviews on Stru[employer] prior to the start of the session:      [x] Verified patient identification by name and date of birth    [x] Agreement on all muscles being treated was verified   [x] Purpose of dry needling, side effects, possible complications, risks and benefits were explained to the patient   [x] Procedure site(s) verified  [x] Patient was positioned for comfort and draped for privacy  [x] Informed Consent was signed (initial visit) and verified verbally (subsequent visits)  [x] Patient was instructed on the need to report the use of blood thinners and/or immunosuppressant medications  [x] How to respond to possible adverse effects of treatment  [x] Self treatment of post needling soreness: ice, heat (moist heat, heat wraps) and stretching  [x] Opportunity was given to ask any questions, all questions were answered            Time: 4:40  Date of procedure: 10/12/2017    Treatment: The following muscles were treated today with intramuscular dry needling  [] Left [] Right Abdominals: Ant Rectus Abdominis  [] Left [] Right External Oblique / Internal Oblique / Transverse Abdominis  [] Left [] Right Thoracic Multifidi / Rotatores  [x] Left [x] Right Iliocostalis Thoracis / Lumborum  [x] Left [x] Right Longissimus Thoracis / Lumborum  [x] Left [] Right Lumbar Multifidi  [] Left [] Right Serratus Posterior Inferior  [] Left [] Right Quadratus Lumborum  [] Left [] Right Psoas  [] Left [] Right Iliacus  [] Left [] Right Iliopsoas (inguinal)  [] Left [] Right Piriformis  [] Left [] Right Quadratus Femoris  [] Left [] Right Janeth Fierro / Estefaniasarah Hunt / Jan Lima  [] Left [] Right Obturator Internus  [] Left [] Right Obturator Externus / [place] / Inferior Gemellus    [] Left [] Right Tensor Fasciae Loly  [] Left [] Right Iliotibial Band  [] Left [] Right Pectineus  [] Left [] Right Sartorius  [] Left [] Right Gracilis  [] Left [] Right Adductor Brevis / Adductor Longus / Adductor Isac  [] Left [] Right Rectus Femoris / Vastus Lateralis / Vastus Intermedius / Vastus Medialis Obliquus  [] Left [] Right Tibialis Anterior / Posterior  [] Left [] Right Extensor Digitorum Longus / Brevis  [] Left [] Right Extensor Hallucis Longus / Brevis  [] Left [] Right Hamstrings: Biceps Femoris / [place] Juarez / Semimembranosis  [] Left [] Right Popliteus / Planteris  [] Left [] Right Gastrocnemius Medial / Lateral  [] Left [] Right Soleus  [] Left [] Right Peronei: Longus / Constance Ship / Tertius  [] Left [] Right Flexor Digitorum Longus / Brevis  [] Left [] Right Flexor Hallucis Longus / Brevis  [] Left [] Right Quadratus Plantae  [] Left [] Right Abductor Digiti Minimi  [] Left [] Right Foot Interossei  [] Left [] Right Other:    Patient's response to today's treatment:  [x] Latent Twitch Response  [x] Muscle relaxation [] Pain Relief  [x] Post needling soreness [x] without complications  [] Increased Range of Motion  [] Other:     Performed by:  Kurt Harry DPT, JUAN FPT          With   [] TE   [] TA   [] neuro   [] other: Patient Education: [x] Review HEP    [] Progressed/Changed HEP based on:   [] positioning   [] body mechanics   [] transfers   [] heat/ice application    [] other:      Other Objective/Functional Measures: difficulty reducing pelvic rotation with QP interventions, fair improvement with cues     Pain Level (0-10 scale) post treatment: 5    ASSESSMENT/Changes in Function: Pt still reporting moira-compliance with HEP, states he tries to perform every other morning. Increased tone noted in L l/s paraspinals compared to R. He reports improved pain post session, reports positive results from mechanical traction. Continue to progress lumbopelvic mobility and mechanics    Patient will continue to benefit from skilled PT services to modify and progress therapeutic interventions, address functional mobility deficits, address ROM deficits, address strength deficits, analyze and address soft tissue restrictions, analyze and cue movement patterns and analyze and modify body mechanics/ergonomics to attain remaining goals. []  See Plan of Care  []  See progress note/recertification  []  See Discharge Summary         Progress towards goals / Updated goals:  Short Term Goals: To be accomplished in 2 weeks:                         4. I and compliant with HEP for self management of symptoms. Reports semi-compliance 10/4/17  Long Term Goals: To be accomplished in 4 weeks:                         3. Improve FOTO to 69 to indicate improved function with daily activities. 2. Perform supine oov exercises without rectus bulge to indicate improved TA strength/stability for work tasks. 3. Increase B glute max strength to grossly 4+/5 to improve stability for daily activities.      PLAN  []  Upgrade activities as tolerated     []  Continue plan of care  []  Update interventions per flow sheet       []  Discharge due to:_  []  Other:_      Lissa Sen DPT, CMTPT 10/12/2017  4:27 PM    Future Appointments  Date Time Provider Brooke Thompson   10/12/2017 4:30 PM Keyon Mendez [facility]   10/16/2017 6:00 PM Lisa Alvarado, PT [facility]   10/19/2017 7:00 PM [facility] MRI RM 2 [facility]   10/20/2017 6:00 PM Luke Situ [facility]   10/23/2017 6:00 PM Hillary Kruger, PTA [facility]   10/27/2017 4:30 PM Luke Situ [facility]   10/30/2017 9:30 AM [facility]

## 2017-10-16 ENCOUNTER — HOSPITAL ENCOUNTER (OUTPATIENT)
Dept: PHYSICAL THERAPY | Age: 32
Discharge: HOME | End: 2017-10-16
Payer: COMMERCIAL

## 2017-10-16 PROCEDURE — 97012 MECHANICAL TRACTION THERAPY: CPT | Performed by: PHYSICAL THERAPIST

## 2017-10-16 PROCEDURE — 97110 THERAPEUTIC EXERCISES: CPT | Performed by: PHYSICAL THERAPIST

## 2017-10-16 NOTE — PROGRESS NOTES
PT DAILY TREATMENT NOTE     Patient Name: Dell House  Date:10/16/2017  : 1985  [x]  Patient  Verified  Payor: Juan R Valencia / [facility] / Product Type: Commerical /    In time:6:02  Out time:6:52  Total Treatment Time (min): 50  Visit #: 4 of 8    Treatment Area: Low back pain [M54.5]    SUBJECTIVE  Pain Level (0-10 scale): 6  Any medication changes, allergies to medications, adverse drug reactions, diagnosis change, or new procedure performed?: [x] No    [] Yes (see summary sheet for update)  Subjective functional status/changes:   [] No changes reported  Feels alright. No new issues. Just tired at the end of the day. OBJECTIVE    Modality rationale: decrease pain to improve the patients ability to complete ADLs   Min Type Additional Details   10 [x]  Traction: [] Cervical       [x]Lumbar                       [] Prone          []Supine                       [x]Intermittent   []Continuous Lbs:90  [] before manual  [] after manual   [] Skin assessment post-treatment:  []intact []redness- no adverse reaction    []redness  adverse reaction:     40 min Therapeutic Exercise:  [x] See flow sheet :   Rationale: increase ROM, increase strength, improve coordination and decrease pain to improve the patients ability to complete ADLs          With   [] TE   [] TA   [] neuro   [] other: Patient Education: [x] Review HEP    [] Progressed/Changed HEP based on:   [] positioning   [] body mechanics   [] transfers   [] heat/ice application    [] other:        Pain Level (0-10 scale) post treatment: 4-5    ASSESSMENT/Changes in Function: Patient responds well to treatment session. Has difficulty with hip hinge during squats. Using bar on back for tactile cue helps to correct this, also reduced pain with squat.  No adverse effects were noted from today's treatment session      Patient will continue to benefit from skilled PT services to modify and progress therapeutic interventions, address functional mobility deficits, address ROM deficits, address strength deficits, analyze and address soft tissue restrictions, analyze and cue movement patterns, analyze and modify body mechanics/ergonomics and assess and modify postural abnormalities to attain remaining goals. []  See Plan of Care  []  See progress note/recertification  []  See Discharge Summary         Progress towards goals / Updated goals:  Short Term Goals: To be accomplished in 2 weeks:                         3. I and compliant with HEP for self management of symptoms.  Reports semi-compliance 10/4/17  Long Term Goals: To be accomplished in 4 weeks:                         6. Improve FOTO to 69 to indicate improved function with daily activities. 2. Perform supine oov exercises without rectus bulge to indicate improved TA strength/stability for work tasks. 3. Increase B glute max strength to grossly 4+/5 to improve stability for daily activities.      PLAN  []  Upgrade activities as tolerated     [x]  Continue plan of care  []  Update interventions per flow sheet       []  Discharge due to:_  []  Other:_      Osiris Mancuso, PT, DPT 10/16/2017  6:14 PM    Future Appointments  Date Time Provider Brooke Pettyi   10/19/2017 7:00 PM [facility] MRI RM 2 [facility]   10/20/2017 6:00 PM Jeff Mcduffiet [facility]   10/23/2017 6:00 PM Adrianne Coroan PTA [facility]   10/27/2017 4:30 PM Jeff Clement [facility]   10/30/2017 9:30 AM Sonia Lopez  [address]

## 2017-10-17 ENCOUNTER — APPOINTMENT (OUTPATIENT)
Dept: PHYSICAL THERAPY | Age: 32
End: 2017-10-17
Payer: COMMERCIAL

## 2017-10-19 ENCOUNTER — HOSPITAL ENCOUNTER (OUTPATIENT)
Age: 32
Discharge: HOME | End: 2017-10-19
Attending: PHYSICAL MEDICINE & REHABILITATION
Payer: COMMERCIAL

## 2017-10-19 DIAGNOSIS — M54.16 LUMBAR RADICULOPATHY: ICD-10-CM

## 2017-10-19 PROCEDURE — 72148 MRI LUMBAR SPINE W/O DYE: CPT

## 2017-10-23 ENCOUNTER — APPOINTMENT (OUTPATIENT)
Dept: PHYSICAL THERAPY | Age: 32
End: 2017-10-23
Payer: COMMERCIAL

## 2017-10-24 ENCOUNTER — APPOINTMENT (OUTPATIENT)
Dept: PHYSICAL THERAPY | Age: 32
End: 2017-10-24
Payer: COMMERCIAL

## 2017-10-27 ENCOUNTER — APPOINTMENT (OUTPATIENT)
Dept: PHYSICAL THERAPY | Age: 32
End: 2017-10-27
Payer: COMMERCIAL

## 2017-10-30 ENCOUNTER — OFFICE VISIT (OUTPATIENT)
Dept: ORTHOPEDIC SURGERY | Age: 32
End: 2017-10-30

## 2017-10-30 VITALS
RESPIRATION RATE: 18 BRPM | DIASTOLIC BLOOD PRESSURE: 72 MMHG | WEIGHT: 203 LBS | OXYGEN SATURATION: 99 % | SYSTOLIC BLOOD PRESSURE: 117 MMHG | TEMPERATURE: 97.6 F | HEART RATE: 66 BPM | BODY MASS INDEX: 26.9 KG/M2 | HEIGHT: 73 IN

## 2017-10-30 DIAGNOSIS — M79.18 MYOFASCIAL PAIN: Primary | ICD-10-CM

## 2017-10-30 NOTE — MR AVS SNAPSHOT
Visit Information Date & Time Provider Department Dept. Phone Encounter #  
 10/30/2017  9:30 AM Mahnaz Cooney MD [facility] [phone] [id number] Follow-up Instructions Return in about 3 months (around 1/30/2018), or if symptoms worsen or fail to improve. Upcoming Health Maintenance Date Due Pneumococcal 19-64 Medium Risk (1 of 1 - PPSV23) 8/7/2002 DTaP/Tdap/Td series (1 - Tdap) 8/7/2004 INFLUENZA AGE 9 TO ADULT 8/1/2017 Allergies as of 10/30/2017  Review Complete On: 10/30/2017 By: Milton Vides Severity Noted Reaction Type Reactions Penicillins    Unknown (comments) Current Immunizations  Never Reviewed No immunizations on file. Not reviewed this visit You Were Diagnosed With   
  
 Codes Comments Myofascial pain    -  Primary ICD-10-CM: M79.1 ICD-9-CM: 729.1 Vitals BP Pulse Temp Resp Height(growth percentile) Weight(growth percentile) 117/72 66 97.6 °F (36.4 °C) (Oral) 18 6' 1\" (1.854 m) 203 lb (92.1 kg) SpO2 BMI Smoking Status 99% 26.78 kg/m2 Current Every Day Smoker BMI and BSA Data Body Mass Index Body Surface Area  
 26.78 kg/m 2 2.18 m 2 Preferred Pharmacy Pharmacy Name Phone [phone] - [address], [address] AT [address] [phone] Your Updated Medication List  
  
   
This list is accurate as of: 10/30/17 10:48 AM.  Always use your most recent med list.  
  
  
  
  
 HYDROcodone-acetaminophen 5-325 mg per tablet Commonly known as:  [address] Take 1 Tab by mouth four (4) times daily as needed for Pain. Max Daily Amount: 4 Tabs. predniSONE 10 mg dose pack Commonly known as:  STERAPRED DS Take 1 Tab by mouth See Admin Instructions. See administration instruction per 10mg dose pack VENTOLIN HFA 90 mcg/actuation inhaler Generic drug:  albuterol Inhale 2 puffs by mouth every 4-6 hours as needed Follow-up Instructions Return in about 3 months (around 1/30/2018), or if symptoms worsen or fail to improve. Introducing [facility]! Terry Phillip introduces [employer] patient portal. Now you can access parts of your medical record, email your doctor's office, and request medication refills online. 1. In your internet browser, go to [email or web address] 2. Click on the First Time User? Click Here link in the Sign In box. You will see the New Member Sign Up page. 3. Enter your [employer] Access Code exactly as it appears below. You will not need to use this code after youve completed the sign-up process. If you do not sign up before the expiration date, you must request a new code. · [employer] Access Code: [email or web address] Expires: 12/12/2017  4:49 PM 
 
4. Enter the last four digits of your Social Security Number (xxxx) and Date of Birth (mm/dd/yyyy) as indicated and click Submit. You will be taken to the next sign-up page. 5. Create a [employer] ID. This will be your [employer] login ID and cannot be changed, so think of one that is secure and easy to remember. 6. Create a [employer] password. You can change your password at any time. 7. Enter your Password Reset Question and Answer. This can be used at a later time if you forget your password. 8. Enter your e-mail address. You will receive e-mail notification when new information is available in [address] 9. Click Sign Up. You can now view and download portions of your medical record. 10. Click the Download Summary menu link to download a portable copy of your medical information. If you have questions, please visit the Frequently Asked Questions section of the [employer] website. Remember, [employer] is NOT to be used for urgent needs. For medical emergencies, dial 911. Now available from your iPhone and Android! Please provide this summary of care documentation to your next provider. Your primary care clinician is listed as [address]. If you have any questions after today's visit, please call [phone].

## 2017-10-30 NOTE — PROGRESS NOTES
[address]  [address], [address]  [place], [address] [place] [address] Street  Phone: [phone]  Fax: [phone]        Niurka Eaton  : 1985  PCP: Manuel Sosa MD  10/30/2017    PROGRESS NOTE      ASSESSMENT AND PLAN    Lafonda Sandhoff comes in to the office today for a lumbar MRI f/u. The study showed a small disc protrusion and mild facet arthropathy at L4-L5, mildly narrowing the neural foramen and central canal. At this time, I believe the primary pain generator is myofascial in nature. We discussed exercises and proper lifting mechanics to further improve his symptoms. He will continue with the current plan of care at PT. Pt will f/u in 3 months or sooner as needed. Diagnoses and all orders for this visit:    1. Myofascial pain       Follow-up Disposition:  Return in about 3 months (around 2018), or if symptoms worsen or fail to improve. HISTORY OF PRESENT ILLNESS  Lafonda Sandhoff is a 27 y.o. male. A&P / HPI from 2017:  Anil Berryis in to the office today c/o low back pain.       His symptoms are likely discogenic in nature with a potential component of lumbar radiculopathy. On the examination he had flexion based pain. He also had a mildly positive bilateral straight leg raise.       I referred him for [facility]. I prescribed him Norco 5-325 mg TID prn.      HISTORY OF PRESENT ILLNESS  Felicitas Garg a 27 y.o. male c/o chronic low back pain that occurred after a work injury 2 years ago. He describes his symptoms as gradually worsening, constant, throbbing pain. He reports prolonged sitting will exacerbate his symptoms. He notes that standing will help alleviate his pain. He notes back flexion will increase his pain. He reports his pain is also worse with lying in the supine position. He notes back extension helps decrease his pain. He has been treated with Prednisone which did not improve his symptoms.  He was evaluated by his PCP for these symptoms on 08/31/2017. They treated him with a Toradol injection, Norco, and a Prednisone 10 mg dose pack.       Pt denies any fevers, chills, nausea, vomiting. Pt denies any chest pain and shortness of breath. Pt denies any ear, nose, and throat problems. Pt denies any fecal or urinary incontinence.       He works as a .   Arnav Oliveira from Dr. Dheeraj Lopez on 02/15/2016:  HISTORY OF PRESENT ILLNESS: Abiodun Luevano Body a 26 y. o. male who presents to the office for lower back pain.  Pt notes injury a few months ago in which he was caught between the bucket of his truck and a tree branch but did not have initial back pain.  Pt notes pain onset about 4 weeks after injury.  Pt has treated with Vicodin and Tramadol with some relief.  Pt also treated with Ibuprofen with some relief.     Updates from 10/11/17:  Pt presents for a PT f/u. The sessions have not provided significant relief. He has found relief with the traction machine.        Updates from 10/30/17:  Pt presents for a lumbar MRI f/u. His pain today is rated at an constant sharp 6/10. The study showed a small disc protrusion and mild facet arthropathy at L4-L5, mildly narrowing the neural foramen and central canal.       PAST MEDICAL HISTORY   Past Medical History:   Diagnosis Date    Asthma     Right shoulder pain     Subacromial bursitis     Right shoulder       No past surgical history on file. Inessa Canales MEDICATIONS      Current Outpatient Prescriptions   Medication Sig Dispense Refill    albuterol (VENTOLIN HFA) 90 mcg/actuation inhaler Inhale 2 puffs by mouth every 4-6 hours as needed      HYDROcodone-acetaminophen (NORCO) 5-325 mg per tablet Take 1 Tab by mouth four (4) times daily as needed for Pain. Max Daily Amount: 4 Tabs. 28 Tab 0    predniSONE (STERAPRED DS) 10 mg dose pack Take 1 Tab by mouth See Admin Instructions.  See administration instruction per 10mg dose pack 21 Tab 0 ALLERGIES    Allergies   Allergen Reactions    Penicillins Unknown (comments)          SOCIAL HISTORY    Social History     Social History    Marital status:      Spouse name: N/A    Number of children: N/A    Years of education: N/A     Social History Main Topics    Smoking status: Current Every Day Smoker    Smokeless tobacco: Never Used    Alcohol use None    Drug use: No    Sexual activity: Not Asked     Other Topics Concern    None     Social History Narrative       FAMILY HISTORY    Family History   Problem Relation Age of Onset    Asthma Mother     Asthma Father     Hypertension Father          REVIEW OF SYSTEMS  Review of Systems   Constitutional: Negative for chills, diaphoresis, fever, malaise/fatigue and weight loss. Respiratory: Negative for shortness of breath. Cardiovascular: Negative for chest pain and leg swelling. Gastrointestinal: Negative for constipation, nausea and vomiting. Neurological: Negative for dizziness, tingling, seizures, loss of consciousness, weakness and headaches. Psychiatric/Behavioral: The patient does not have insomnia. PHYSICAL EXAMINATION  Visit Vitals    /72    Pulse 66    Temp 97.6 °F (36.4 °C) (Oral)    Resp 18    Ht 6' 1\" (1.854 m)    Wt 203 lb (92.1 kg)    SpO2 99%    BMI 26.78 kg/m2       Pain Assessment  10/30/2017   Location of Pain Back   Severity of Pain 6   Quality of Pain Sharp   Duration of Pain Persistent   Frequency of Pain Constant   Aggravating Factors -   Limiting Behavior -   Relieving Factors -   Result of Injury -           Constitutional:  Well developed, well nourished, in no acute distress. Psychiatric: Affect and mood are appropriate. Integumentary: No rashes or abrasions noted on exposed areas. SPINE/MUSCULOSKELETAL EXAM    Cervical spine:  Neck is midline. Normal muscle tone. No focal atrophy is noted. ROM pain free. Shoulder ROM intact.    No tenderness to palpation. Negative Spurling's sign. Negative Tinel's sign. Negative Contreras's sign.       Sensation in the bilateral arms grossly intact to light touch.       Lumbar spine:  No rash, ecchymosis, or gross obliquity. No fasciculations. No focal atrophy is noted. No pain with hip ROM. Full range of motion. Tenderness to palpation. No tenderness to palpation at the sciatic notch. SI joints non-tender. Trochanters non tender. Pain with back flexion       Sensation in the bilateral legs grossly intact to light touch. MOTOR:      Biceps  Triceps Deltoids Wrist Ext Wrist Flex Hand Intrin   Right 5/5 5/5 5/5 5/5 5/5 5/5   Left 5/5 5/5 5/5 5/5 5/5 5/5             Hip Flex  Quads Hamstrings Ankle DF EHL Ankle PF   Right 5/5 5/5 5/5 5/5 5/5 5/5   Left 5/5 5/5 5/5 5/5 5/5 5/5     DTRs are 2+ biceps, triceps, brachioradialis, patella, and Achilles.      Positive bilateral Straight Leg raise. Squat not tested. No difficulty with tandem gait.       Ambulation without assistive device. FWB.       RADIOGRAPHS  Lumbar MRI images taken on 10/19/2017 personally reviewed with patient:  FINDINGS:  There is normal alignment of the lumbar spine. Vertebral body heights are  maintained. Marrow signal is normal.  Mild degenerative discogenic disease at  L4-L5. Remainder of intervertebral discs are preserved. The conus medullaris  terminates at L1.      T12/L1:  The spinal canal and neuroforamina are widely patent.     L1/2:  The spinal canal and neural foramina are widely patent.     L2/3:  The spinal canal and neural foramina are widely patent.     L3/4:  Mild facet arthropathy. No significant central canal stenosis. Mild  foraminal stenosis.     L4/5:  Mild disc bulge with small central disc protrusion. Mild facet  arthropathy. Mild narrowing of the neural foramen. Mild central canal stenosis.      L5/S1:  Mild disc bulge. Mild facet arthropathy.  No significant central canal or  foraminal stenosis.       IMPRESSION  IMPRESSION:       Small central disc protrusion and mild facet arthropathy at L4-L5, mildly  narrowing the neural foramen and central canal.    Lumbar XR images taken on 02/15/2016 personally reviewed with patient:  X-rays unremarkable for any disc space narrowing, spondylolisthesis, or other congenital abnormalities.        reviewed      Mr. Gonzalez has a reminder for a \"due or due soon\" health maintenance. I have asked that he contact his primary care provider for follow-up on this health maintenance.       This plan was reviewed with the patient and patient agrees. All questions were answered. More than half of this visit today was spent on counseling.      Written by Yogesh Castano, as dictated by Dr. Zoe Douglas, Dr. Viviana Carias, confirm that all documentation is accurate.

## 2017-11-15 NOTE — PROGRESS NOTES
[facility]   [address]  [place], [address]  [phone] fax    Physical Therapy Discharge Summary  Patient name: Rosibel Demarco Start of Care: 17   Referral source: Ariadna Otoole MD : 1985   Medical/Treatment Diagnosis: Low back pain [M54.5] Onset Date:> one month ago     Prior Hospitalization: see medical history Provider#: [id number]   Medications: Verified on Patient Summary List    Comorbidities: tobacco use; asthma   Prior Level of Function: ; able to tolerate prolonged positions      Visits from Start of Care: 4    Missed Visits: 3  Reporting Period : 17 to 10/16/17      Summary of Care:  Short Term Goals: To be accomplished in 2 weeks:                         0. I and compliant with HEP for self management of symptoms.  Reports semi-compliance 10/4/17  Long Term Goals: To be accomplished in 4 weeks:                         1. Improve FOTO to 69 to indicate improved function with daily activities. 2. Perform supine oov exercises without rectus bulge to indicate improved TA strength/stability for work tasks. 3. Increase B glute max strength to grossly 4+/5 to improve stability for daily activities.   Landon Santiago has not returned to PT since 10/16/17; unplanned D/C.      ASSESSMENT/RECOMMENDATIONS:  [x]Discontinue therapy: []Patient has reached or is progressing toward set goals      [x]Patient is non-compliant or has abdicated      []Due to lack of appreciable progress towards set [address], PT 11/15/2017 11:05 AM

## 2019-04-10 ENCOUNTER — TELEPHONE (OUTPATIENT)
Dept: INTERNAL MEDICINE CLINIC | Age: 34
End: 2019-04-10

## 2019-04-10 ENCOUNTER — OFFICE VISIT (OUTPATIENT)
Dept: INTERNAL MEDICINE CLINIC | Age: 34
End: 2019-04-10

## 2019-04-10 VITALS
HEIGHT: 73 IN | OXYGEN SATURATION: 98 % | DIASTOLIC BLOOD PRESSURE: 70 MMHG | BODY MASS INDEX: 25.34 KG/M2 | RESPIRATION RATE: 12 BRPM | WEIGHT: 191.2 LBS | TEMPERATURE: 98.1 F | SYSTOLIC BLOOD PRESSURE: 108 MMHG | HEART RATE: 60 BPM

## 2019-04-10 DIAGNOSIS — M25.512 ACUTE PAIN OF LEFT SHOULDER: ICD-10-CM

## 2019-04-10 DIAGNOSIS — Z00.00 ROUTINE GENERAL MEDICAL EXAMINATION AT A HEALTH CARE FACILITY: Primary | ICD-10-CM

## 2019-04-10 LAB
A-G RATIO,AGRAT: 2.6 RATIO (ref 1.1–2.6)
ALBUMIN SERPL-MCNC: 4.9 G/DL (ref 3.5–5)
ALP SERPL-CCNC: 60 U/L (ref 25–115)
ALT SERPL-CCNC: 19 U/L (ref 5–40)
ANION GAP SERPL CALC-SCNC: 16 MMOL/L
AST SERPL W P-5'-P-CCNC: 13 U/L (ref 10–37)
BILIRUB SERPL-MCNC: 0.2 MG/DL (ref 0.2–1.2)
BUN SERPL-MCNC: 16 MG/DL (ref 6–22)
CALCIUM SERPL-MCNC: 9.6 MG/DL (ref 8.4–10.4)
CHLORIDE SERPL-SCNC: 101 MMOL/L (ref 98–110)
CHOLEST SERPL-MCNC: 188 MG/DL (ref 110–200)
CO2 SERPL-SCNC: 21 MMOL/L (ref 20–32)
CREAT SERPL-MCNC: 1 MG/DL (ref 0.5–1.2)
GFRAA, 66117: >60
GFRNA, 66118: >60
GLOBULIN,GLOB: 1.9 G/DL (ref 2–4)
GLUCOSE SERPL-MCNC: 101 MG/DL (ref 70–99)
HDLC SERPL-MCNC: 3.6 MG/DL (ref 0–5)
HDLC SERPL-MCNC: 52 MG/DL (ref 40–59)
LDLC SERPL CALC-MCNC: 122 MG/DL (ref 50–99)
POTASSIUM SERPL-SCNC: 4.5 MMOL/L (ref 3.5–5.5)
PROT SERPL-MCNC: 6.8 G/DL (ref 6.4–8.3)
SODIUM SERPL-SCNC: 138 MMOL/L (ref 133–145)
TRIGL SERPL-MCNC: 74 MG/DL (ref 40–149)
VLDLC SERPL CALC-MCNC: 15 MG/DL (ref 8–30)

## 2019-04-10 RX ORDER — PREDNISONE 20 MG/1
TABLET ORAL
Qty: 25 TAB | Refills: 0 | Status: SHIPPED | OUTPATIENT
Start: 2019-04-10 | End: 2020-04-22

## 2019-04-10 RX ORDER — IBUPROFEN 200 MG
TABLET ORAL
COMMUNITY

## 2019-04-10 RX ORDER — ACETAMINOPHEN 325 MG/1
TABLET ORAL
COMMUNITY

## 2019-04-10 NOTE — TELEPHONE ENCOUNTER
Pt is dominick [facility] for an xray says he has been there over an hour and was told it will be another 2 hours he said Dr was going to perscribe an anti inflammatory    After he gets his xray , pt said he can't wait anymore and asking if Dr would call something in  Please advise

## 2019-04-10 NOTE — PROGRESS NOTES
Yoshi Mcfadden 1985, is a 26 y.o. male, who is seen today for new patient routine physical exam, evaluation of new left shoulder pain/injury, obesity and cough and dyspnea. He is here with his wife. 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 The pain is sometimes up into the postero- lateral left neck sometimes down as far as the elbow,  throbbing discomfort and an aching discomfort at various times. It feels like his heart beating in his upper arm when it is at its worst.  The pain is no better today than it was the day after the injury. He has seen bruising which is changing color in the upper left arm. He wants to see improvement soon so we can get back to work, and his current condition with his pain shoulder and neck he cannot work. He has used ibuprofen but really cannot tell me how many he has used in the time, he has gotten very little relief so he has been taking mainly with the [facility] powders 4-5/day. That helps a little bit. He is not sleeping well because of the pain and movement of the shoulder makes it worse. He also is having to use albuterol much more than he used to for dyspnea, he smokes a pack of cigarettes daily, he had gone 5 years without smoking and then because of stress at work and the fact that other coworkers smoke he went back to smoking probably at least 2 years ago. No other current planes or past history. He has not had any lab done in the last few years as far as he recalls. Past Medical History:  
Diagnosis Date  Asthma  Right shoulder pain  Subacromial bursitis Right shoulder History reviewed. No pertinent surgical history. Current Outpatient Medications Medication Sig Dispense Refill  ibuprofen (MOTRIN IB) 200 mg tablet Take  by mouth.  acetaminophen (TYLENOL) 325 mg tablet Take  by mouth every four (4) hours as needed for Pain.  albuterol (VENTOLIN HFA) 90 mcg/actuation inhaler Inhale 2 puffs by mouth every 4-6 hours as needed Allergies Allergen Reactions  Penicillins Unknown (comments) Social History Socioeconomic History  Marital status: SINGLE Spouse name: Not on file  Number of children: Not on file  Years of education: Not on file  Highest education level: Not on file Tobacco Use  Smoking status: Current Every Day Smoker  Smokeless tobacco: Never Used Substance and Sexual Activity  Drug use: No  
 Sexual activity: Yes  
  Partners: Female Visit Vitals /70 (BP 1 Location: Left arm, BP Patient Position: Sitting) Pulse 60 Temp 98.1 °F (36.7 °C) (Oral) Resp 12 Ht 6' 1\" (1.854 m) Wt 191 lb 3.2 oz (86.7 kg) SpO2 98% BMI 25.23 kg/m² Neck reveals no adenopathy or thyromegaly. Carotids are 2+. Good range of motion of his neck with very little discomfort in the posterior lateral neck with extension and downward pressure and no increase in the pain into the shoulder interscapular area or down the arm with this maneuver. With range of motion of his left shoulder there is a lot of pain in the shoulder including with internal and external rotation and elevation. Mild anterior shoulder tenderness. Over the deltoid there is a yellowish discoloration sharply demarcated, typical of recent bruise. This is not palpable. Minimal tenderness over the muscle. Radial pulses are 2+.  strength is normal.  Did not check strength in his upper arm because of the pain. Lungs are clear to percussion. Good breath sounds with no wheezing or crackles. Heart reveals a regular rhythm with normal S1 and S2 no murmur gallop click or rub. Apical impulse is not palpable.   Abdomen is obese soft nontender with no obvious hepatosplenic megaly or masses. No bruits. Extremities reveal no clubbing cyanosis or edema. Pulses are 2+. Assessment: #1. Recent direct trauma to the left shoulder and upper arm, still hurting a lot with visible bruising. I will have him get an x-ray of that left shoulder this morning and call the results to him. If there is no dislocation or fracture I will send in a prescription for dexamethasone for the patient. #2. The pain is extending all the way to the elbow but the bruising extends almost that far and most likely inflammation from the blood and crush injury is causing the pain rather than radiculopathy. #3.  Obesity, of encouraged him to work on weight loss but more importantly quit smoking first.  Stands that obesity is a significant risk factor for other diseases including high blood pressure, the development of diabetes, and other chronic ailments. #4.  Dyspnea and cough relieved fairly well with albuterol, this has become more of a problem the last couple of years since he went back to smoking again. I told him how important it is to quit smoking but in the meantime he will continue albuterol. I do not hear any wheezing currently. He may experience some improvement in the dyspnea and cough if we use steroids, but that is certainly not the reason for using steroids at this point. We will do lab work for him this morning also, he has not had anything to eat so far this morning. Milton Charles, [address] [place], [place] [address] Please note: This document has been produced using voice recognition software. Unrecognized errors in transcription may be present.

## 2019-04-11 ENCOUNTER — TELEPHONE (OUTPATIENT)
Dept: INTERNAL MEDICINE CLINIC | Age: 34
End: 2019-04-11

## 2019-04-11 LAB
ABSOLUTE LYMPHOCYTE COUNT, 10803: 2.1 K/UL (ref 1–4.8)
BASOPHILS # BLD: 0 K/UL (ref 0–0.2)
BASOPHILS NFR BLD: 1 % (ref 0–2)
EOSINOPHIL # BLD: 0.2 K/UL (ref 0–0.5)
EOSINOPHIL NFR BLD: 3 % (ref 0–6)
ERYTHROCYTE [DISTWIDTH] IN BLOOD BY AUTOMATED COUNT: 13.4 % (ref 10–15.5)
GRANULOCYTES,GRANS: 46 % (ref 40–75)
HCT VFR BLD AUTO: 46.6 % (ref 36.6–51.9)
HGB BLD-MCNC: 15.6 G/DL (ref 13.2–17.3)
LYMPHOCYTES, LYMLT: 42 % (ref 20–45)
MCH RBC QN AUTO: 31 PG (ref 26–34)
MCHC RBC AUTO-ENTMCNC: 34 G/DL (ref 31–36)
MCV RBC AUTO: 91 FL (ref 80–95)
MONOCYTES # BLD: 0.4 K/UL (ref 0.1–1)
MONOCYTES NFR BLD: 8 % (ref 3–12)
NEUTROPHILS # BLD AUTO: 2.3 K/UL (ref 1.8–7.7)
PLATELET # BLD AUTO: 306 K/UL (ref 140–440)
PMV BLD AUTO: 10.9 FL (ref 9–13)
RBC # BLD AUTO: 5.11 M/UL (ref 3.8–5.8)
WBC # BLD AUTO: 4.9 K/UL (ref 4–11)

## 2019-04-11 NOTE — PROGRESS NOTES
Please notify the patient that his blood count is normal, all of his chemistries are normal and his cholesterol is normal.

## 2019-04-11 NOTE — TELEPHONE ENCOUNTER
Chief Complaint   Patient presents with    Labs     done 04-10-19 per Dr Jose Angel Mandujano     04-11-19 unable to leave a message, due to Voice Box is Full.

## 2019-04-11 NOTE — TELEPHONE ENCOUNTER
----- Message from Razia Davis MD sent at 4/11/2019  7:37 AM EDT -----  Please notify the patient that his blood count is normal, all of his chemistries are normal and his cholesterol is normal.

## 2022-04-05 ENCOUNTER — OFFICE VISIT (OUTPATIENT)
Dept: ORTHOPEDIC SURGERY | Age: 37
End: 2022-04-05
Payer: COMMERCIAL

## 2022-04-05 VITALS — HEART RATE: 81 BPM | BODY MASS INDEX: 23.06 KG/M2 | HEIGHT: 73 IN | OXYGEN SATURATION: 98 % | WEIGHT: 174 LBS

## 2022-04-05 DIAGNOSIS — M75.102 TEAR OF LEFT ROTATOR CUFF, UNSPECIFIED TEAR EXTENT, UNSPECIFIED WHETHER TRAUMATIC: Primary | ICD-10-CM

## 2022-04-05 PROCEDURE — 99203 OFFICE O/P NEW LOW 30 MIN: CPT | Performed by: ORTHOPAEDIC SURGERY

## 2022-04-05 RX ORDER — MELOXICAM 15 MG/1
15 TABLET ORAL DAILY
Qty: 30 TABLET | Refills: 1 | Status: SHIPPED | OUTPATIENT
Start: 2022-04-05
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
